# Patient Record
Sex: MALE | Race: BLACK OR AFRICAN AMERICAN | NOT HISPANIC OR LATINO
[De-identification: names, ages, dates, MRNs, and addresses within clinical notes are randomized per-mention and may not be internally consistent; named-entity substitution may affect disease eponyms.]

---

## 2018-05-07 ENCOUNTER — APPOINTMENT (OUTPATIENT)
Dept: DERMATOLOGY | Facility: CLINIC | Age: 48
End: 2018-05-07
Payer: COMMERCIAL

## 2018-05-07 VITALS — BODY MASS INDEX: 29.37 KG/M2 | DIASTOLIC BLOOD PRESSURE: 90 MMHG | SYSTOLIC BLOOD PRESSURE: 110 MMHG | WEIGHT: 235 LBS

## 2018-05-07 DIAGNOSIS — L81.0 POSTINFLAMMATORY HYPERPIGMENTATION: ICD-10-CM

## 2018-05-07 DIAGNOSIS — Z91.89 OTHER SPECIFIED PERSONAL RISK FACTORS, NOT ELSEWHERE CLASSIFIED: ICD-10-CM

## 2018-05-07 DIAGNOSIS — L70.0 ACNE VULGARIS: ICD-10-CM

## 2018-05-07 PROCEDURE — 99203 OFFICE O/P NEW LOW 30 MIN: CPT

## 2018-05-07 RX ORDER — TRETINOIN 0.25 MG/G
0.03 CREAM TOPICAL DAILY
Qty: 1 | Refills: 2 | Status: ACTIVE | COMMUNITY
Start: 2018-05-07 | End: 1900-01-01

## 2018-05-07 RX ORDER — DOXYCYCLINE HYCLATE 100 MG/1
100 TABLET ORAL TWICE DAILY
Qty: 1 | Refills: 1 | Status: ACTIVE | COMMUNITY
Start: 2018-05-07 | End: 1900-01-01

## 2020-02-20 ENCOUNTER — EMERGENCY (EMERGENCY)
Facility: HOSPITAL | Age: 50
LOS: 1 days | Discharge: HOME | End: 2020-02-20
Attending: STUDENT IN AN ORGANIZED HEALTH CARE EDUCATION/TRAINING PROGRAM | Admitting: STUDENT IN AN ORGANIZED HEALTH CARE EDUCATION/TRAINING PROGRAM
Payer: OTHER MISCELLANEOUS

## 2020-02-20 VITALS
DIASTOLIC BLOOD PRESSURE: 85 MMHG | TEMPERATURE: 98 F | SYSTOLIC BLOOD PRESSURE: 145 MMHG | OXYGEN SATURATION: 100 % | RESPIRATION RATE: 16 BRPM | HEART RATE: 75 BPM

## 2020-02-20 PROCEDURE — 12002 RPR S/N/AX/GEN/TRNK2.6-7.5CM: CPT

## 2020-02-20 PROCEDURE — 99283 EMERGENCY DEPT VISIT LOW MDM: CPT | Mod: 25,GC

## 2020-02-20 PROCEDURE — 73140 X-RAY EXAM OF FINGER(S): CPT | Mod: 26,RT

## 2020-02-20 RX ORDER — TETANUS TOXOID, REDUCED DIPHTHERIA TOXOID AND ACELLULAR PERTUSSIS VACCINE, ADSORBED 5; 2.5; 8; 8; 2.5 [IU]/.5ML; [IU]/.5ML; UG/.5ML; UG/.5ML; UG/.5ML
0.5 SUSPENSION INTRAMUSCULAR ONCE
Refills: 0 | Status: DISCONTINUED | OUTPATIENT
Start: 2020-02-20 | End: 2020-02-20

## 2020-02-20 RX ORDER — ACETAMINOPHEN 500 MG
975 TABLET ORAL ONCE
Refills: 0 | Status: COMPLETED | OUTPATIENT
Start: 2020-02-20 | End: 2020-02-20

## 2020-02-20 RX ORDER — TETANUS TOXOID, REDUCED DIPHTHERIA TOXOID AND ACELLULAR PERTUSSIS VACCINE, ADSORBED 5; 2.5; 8; 8; 2.5 [IU]/.5ML; [IU]/.5ML; UG/.5ML; UG/.5ML; UG/.5ML
0.5 SUSPENSION INTRAMUSCULAR ONCE
Refills: 0 | Status: COMPLETED | OUTPATIENT
Start: 2020-02-20 | End: 2020-02-20

## 2020-02-20 RX ADMIN — TETANUS TOXOID, REDUCED DIPHTHERIA TOXOID AND ACELLULAR PERTUSSIS VACCINE, ADSORBED 0.5 MILLILITER(S): 5; 2.5; 8; 8; 2.5 SUSPENSION INTRAMUSCULAR at 09:31

## 2020-02-20 RX ADMIN — Medication 975 MILLIGRAM(S): at 09:32

## 2020-02-20 NOTE — ED ADULT NURSE NOTE - OBJECTIVE STATEMENT
Pt received to intake room 9. Pt comes to ED c/o laceration & pain to right thumb after cutting his right thumb on a romero can. Denies fevers, chills, headache, dizziness, chest pain, dyspnea, N/V/D, palpitations, cough. Respirations are even & unlabored. Pt has full ROM in his right thumb, radial pulses present bilat. Laceration is closed, dry, no bleeding noted, is 3cm in size. Pt is A&Ox4, ambulates independently.

## 2020-02-20 NOTE — ED PROVIDER NOTE - PATIENT PORTAL LINK FT
You can access the FollowMyHealth Patient Portal offered by [facility] by registering at the following website: [email or web address]. By joining [facility]’s FollowMyHealth portal, you will also be able to view your health information using other applications (apps) compatible with our system.

## 2020-02-20 NOTE — ED PROVIDER NOTE - PHYSICAL EXAMINATION
General: well appearing, interactive, well nourished, NAD  Neuro: Moving all extremities, no weakness or numbness appreciated over R hand   Skin:  normal color for race  R thumb: 4cm lac over volar aspect of 1st R finger, well approximated no active bleeding

## 2020-02-20 NOTE — ED ADULT NURSE NOTE - CHIEF COMPLAINT QUOTE
Pt complaining of laceration to R thumb. Pt states he cut his thumb on a romero can at work. Denies use of blood thinners.

## 2020-02-20 NOTE — ED PROVIDER NOTE - ATTENDING CONTRIBUTION TO CARE
49M p/w lac to R thumb. Pt moving romero barrel at work, cut through glove. Injury occurred ~2 hrs prior to eval. No previous tetanus. Reports pain to distal thumb and incision site, no other pain within the hand. FROM without pain or difficulty. No joint involvement.  VS reviewed  Gen: nad  MSK: swelling to pupl of distal thumb without extension over joint; well approximated ~4cm laceration through superficial skin without gapping of wound, no acute bleed, no foreign body appreciated  MDM: 49M p/w laceration to R thumb - given rust and dirty environment, will give tetanus now, XRay to assess for foreign body - not open wound requiring suture, will place dermabond over wound after copious irrigation

## 2020-02-20 NOTE — ED PROVIDER NOTE - CLINICAL SUMMARY MEDICAL DECISION MAKING FREE TEXT BOX
Trever PGY-2:  50yo M here with R finger lac, very well approximated, will update tetanus, irrigate, pain control, dermabond and d/c

## 2020-02-20 NOTE — ED PROVIDER NOTE - NSFOLLOWUPINSTRUCTIONS_ED_ALL_ED_FT
(1) Follow up with your primary care physician as discussed.  (2) Immediately seek care at your nearest emergency room if your worsen, persist, or do not resolve   (3) Take Tylenol (up to 1000mg or 1 g)  and/or Motrin (up to 600mg) up to every 6 hours as needed for pain.

## 2020-02-20 NOTE — ED PROVIDER NOTE - NS ED ROS FT
CONSTITUTIONAL: No fevers, no chills  NEURO:  no weakness or numbness  PSYCHIATRIC: no known mental health issues.

## 2020-02-20 NOTE — ED ADULT TRIAGE NOTE - CHIEF COMPLAINT QUOTE
Pt complaining of laceration to R thumb. Pt states he cut his thumb on a romero can at work. Denies use of blood thinners. awaiting bed, no change

## 2020-02-20 NOTE — ED PROCEDURE NOTE - ATTENDING CONTRIBUTION TO CARE
Pt seen and examined with resident. I was present and available for assistance. Pt with superficial lac to pulp of R thumb, edges well approximated. Dermabond applied. Pt tolerated procedure well.

## 2020-02-20 NOTE — ED PROVIDER NOTE - OBJECTIVE STATEMENT
48yo M no pmhx here with finger lac    At work accidently cut finger (R 1st thumb) on can while wearing gloves, came to ED after this occurred ~1 hour ago. No numbness or weakness of finger or elsewhere, no trauma elsewhere   Does not remember last tetanus  Denies pmhx

## 2020-04-17 ENCOUNTER — TRANSCRIPTION ENCOUNTER (OUTPATIENT)
Age: 50
End: 2020-04-17
